# Patient Record
Sex: FEMALE | Race: BLACK OR AFRICAN AMERICAN | NOT HISPANIC OR LATINO | URBAN - METROPOLITAN AREA
[De-identification: names, ages, dates, MRNs, and addresses within clinical notes are randomized per-mention and may not be internally consistent; named-entity substitution may affect disease eponyms.]

---

## 2021-01-27 ENCOUNTER — INPATIENT (INPATIENT)
Facility: HOSPITAL | Age: 31
LOS: 1 days | Discharge: HOME | End: 2021-01-29
Attending: OBSTETRICS & GYNECOLOGY | Admitting: OBSTETRICS & GYNECOLOGY
Payer: COMMERCIAL

## 2021-01-27 VITALS
DIASTOLIC BLOOD PRESSURE: 64 MMHG | OXYGEN SATURATION: 98 % | RESPIRATION RATE: 18 BRPM | TEMPERATURE: 98 F | HEART RATE: 82 BPM | SYSTOLIC BLOOD PRESSURE: 124 MMHG

## 2021-01-27 DIAGNOSIS — Z3A.00 WEEKS OF GESTATION OF PREGNANCY NOT SPECIFIED: ICD-10-CM

## 2021-01-27 DIAGNOSIS — Z34.80 ENCOUNTER FOR SUPERVISION OF OTHER NORMAL PREGNANCY, UNSPECIFIED TRIMESTER: ICD-10-CM

## 2021-01-27 DIAGNOSIS — O26.899 OTHER SPECIFIED PREGNANCY RELATED CONDITIONS, UNSPECIFIED TRIMESTER: ICD-10-CM

## 2021-01-27 LAB
BASOPHILS # BLD AUTO: 0.01 K/UL — SIGNIFICANT CHANGE UP (ref 0–0.2)
BASOPHILS NFR BLD AUTO: 0.1 % — SIGNIFICANT CHANGE UP (ref 0–2)
BLD GP AB SCN SERPL QL: NEGATIVE — SIGNIFICANT CHANGE UP
EOSINOPHIL # BLD AUTO: 0.04 K/UL — SIGNIFICANT CHANGE UP (ref 0–0.5)
EOSINOPHIL NFR BLD AUTO: 0.5 % — SIGNIFICANT CHANGE UP (ref 0–6)
HCT VFR BLD CALC: 31.9 % — LOW (ref 34.5–45)
HGB BLD-MCNC: 10.4 G/DL — LOW (ref 11.5–15.5)
IMM GRANULOCYTES NFR BLD AUTO: 0.6 % — SIGNIFICANT CHANGE UP (ref 0–1.5)
LYMPHOCYTES # BLD AUTO: 2 K/UL — SIGNIFICANT CHANGE UP (ref 1–3.3)
LYMPHOCYTES # BLD AUTO: 24 % — SIGNIFICANT CHANGE UP (ref 13–44)
MCHC RBC-ENTMCNC: 27.1 PG — SIGNIFICANT CHANGE UP (ref 27–34)
MCHC RBC-ENTMCNC: 32.6 GM/DL — SIGNIFICANT CHANGE UP (ref 32–36)
MCV RBC AUTO: 83.1 FL — SIGNIFICANT CHANGE UP (ref 80–100)
MONOCYTES # BLD AUTO: 0.83 K/UL — SIGNIFICANT CHANGE UP (ref 0–0.9)
MONOCYTES NFR BLD AUTO: 10 % — SIGNIFICANT CHANGE UP (ref 2–14)
NEUTROPHILS # BLD AUTO: 5.4 K/UL — SIGNIFICANT CHANGE UP (ref 1.8–7.4)
NEUTROPHILS NFR BLD AUTO: 64.8 % — SIGNIFICANT CHANGE UP (ref 43–77)
NRBC # BLD: 0 /100 WBCS — SIGNIFICANT CHANGE UP (ref 0–0)
PLATELET # BLD AUTO: 184 K/UL — SIGNIFICANT CHANGE UP (ref 150–400)
RBC # BLD: 3.84 M/UL — SIGNIFICANT CHANGE UP (ref 3.8–5.2)
RBC # FLD: 16 % — HIGH (ref 10.3–14.5)
RH IG SCN BLD-IMP: POSITIVE — SIGNIFICANT CHANGE UP
SARS-COV-2 IGG SERPL QL IA: POSITIVE
SARS-COV-2 IGM SERPL IA-ACNC: 2.78 INDEX — HIGH
SARS-COV-2 RNA SPEC QL NAA+PROBE: SIGNIFICANT CHANGE UP
T PALLIDUM AB TITR SER: NEGATIVE — SIGNIFICANT CHANGE UP
WBC # BLD: 8.33 K/UL — SIGNIFICANT CHANGE UP (ref 3.8–10.5)
WBC # FLD AUTO: 8.33 K/UL — SIGNIFICANT CHANGE UP (ref 3.8–10.5)

## 2021-01-27 RX ORDER — DIPHENHYDRAMINE HCL 50 MG
25 CAPSULE ORAL EVERY 6 HOURS
Refills: 0 | Status: DISCONTINUED | OUTPATIENT
Start: 2021-01-27 | End: 2021-01-29

## 2021-01-27 RX ORDER — TETANUS TOXOID, REDUCED DIPHTHERIA TOXOID AND ACELLULAR PERTUSSIS VACCINE, ADSORBED 5; 2.5; 8; 8; 2.5 [IU]/.5ML; [IU]/.5ML; UG/.5ML; UG/.5ML; UG/.5ML
0.5 SUSPENSION INTRAMUSCULAR ONCE
Refills: 0 | Status: DISCONTINUED | OUTPATIENT
Start: 2021-01-27 | End: 2021-01-29

## 2021-01-27 RX ORDER — BENZOCAINE 10 %
1 GEL (GRAM) MUCOUS MEMBRANE EVERY 6 HOURS
Refills: 0 | Status: DISCONTINUED | OUTPATIENT
Start: 2021-01-27 | End: 2021-01-29

## 2021-01-27 RX ORDER — MAGNESIUM HYDROXIDE 400 MG/1
30 TABLET, CHEWABLE ORAL
Refills: 0 | Status: DISCONTINUED | OUTPATIENT
Start: 2021-01-27 | End: 2021-01-29

## 2021-01-27 RX ORDER — CITRIC ACID/SODIUM CITRATE 300-500 MG
15 SOLUTION, ORAL ORAL ONCE
Refills: 0 | Status: COMPLETED | OUTPATIENT
Start: 2021-01-27 | End: 2021-01-27

## 2021-01-27 RX ORDER — SODIUM CHLORIDE 9 MG/ML
1000 INJECTION, SOLUTION INTRAVENOUS
Refills: 0 | Status: DISCONTINUED | OUTPATIENT
Start: 2021-01-27 | End: 2021-01-27

## 2021-01-27 RX ORDER — MORPHINE SULFATE 50 MG/1
4 CAPSULE, EXTENDED RELEASE ORAL ONCE
Refills: 0 | Status: DISCONTINUED | OUTPATIENT
Start: 2021-01-27 | End: 2021-01-27

## 2021-01-27 RX ORDER — OXYCODONE HYDROCHLORIDE 5 MG/1
5 TABLET ORAL
Refills: 0 | Status: DISCONTINUED | OUTPATIENT
Start: 2021-01-27 | End: 2021-01-29

## 2021-01-27 RX ORDER — LANOLIN
1 OINTMENT (GRAM) TOPICAL EVERY 6 HOURS
Refills: 0 | Status: DISCONTINUED | OUTPATIENT
Start: 2021-01-27 | End: 2021-01-29

## 2021-01-27 RX ORDER — DIBUCAINE 1 %
1 OINTMENT (GRAM) RECTAL EVERY 6 HOURS
Refills: 0 | Status: DISCONTINUED | OUTPATIENT
Start: 2021-01-27 | End: 2021-01-29

## 2021-01-27 RX ORDER — CITRIC ACID/SODIUM CITRATE 300-500 MG
15 SOLUTION, ORAL ORAL EVERY 6 HOURS
Refills: 0 | Status: DISCONTINUED | OUTPATIENT
Start: 2021-01-27 | End: 2021-01-27

## 2021-01-27 RX ORDER — IBUPROFEN 200 MG
600 TABLET ORAL EVERY 6 HOURS
Refills: 0 | Status: DISCONTINUED | OUTPATIENT
Start: 2021-01-27 | End: 2021-01-29

## 2021-01-27 RX ORDER — ACETAMINOPHEN 500 MG
975 TABLET ORAL
Refills: 0 | Status: DISCONTINUED | OUTPATIENT
Start: 2021-01-27 | End: 2021-01-29

## 2021-01-27 RX ORDER — KETOROLAC TROMETHAMINE 30 MG/ML
30 SYRINGE (ML) INJECTION ONCE
Refills: 0 | Status: DISCONTINUED | OUTPATIENT
Start: 2021-01-27 | End: 2021-01-27

## 2021-01-27 RX ORDER — OXYCODONE HYDROCHLORIDE 5 MG/1
5 TABLET ORAL ONCE
Refills: 0 | Status: DISCONTINUED | OUTPATIENT
Start: 2021-01-27 | End: 2021-01-29

## 2021-01-27 RX ORDER — AER TRAVELER 0.5 G/1
1 SOLUTION RECTAL; TOPICAL EVERY 4 HOURS
Refills: 0 | Status: DISCONTINUED | OUTPATIENT
Start: 2021-01-27 | End: 2021-01-29

## 2021-01-27 RX ORDER — OXYTOCIN 10 UNIT/ML
333.33 VIAL (ML) INJECTION
Qty: 20 | Refills: 0 | Status: DISCONTINUED | OUTPATIENT
Start: 2021-01-27 | End: 2021-01-29

## 2021-01-27 RX ORDER — IBUPROFEN 200 MG
600 TABLET ORAL EVERY 6 HOURS
Refills: 0 | Status: COMPLETED | OUTPATIENT
Start: 2021-01-27 | End: 2021-12-26

## 2021-01-27 RX ORDER — SIMETHICONE 80 MG/1
80 TABLET, CHEWABLE ORAL EVERY 4 HOURS
Refills: 0 | Status: DISCONTINUED | OUTPATIENT
Start: 2021-01-27 | End: 2021-01-29

## 2021-01-27 RX ORDER — HYDROCORTISONE 1 %
1 OINTMENT (GRAM) TOPICAL EVERY 6 HOURS
Refills: 0 | Status: DISCONTINUED | OUTPATIENT
Start: 2021-01-27 | End: 2021-01-29

## 2021-01-27 RX ORDER — SODIUM CHLORIDE 9 MG/ML
3 INJECTION INTRAMUSCULAR; INTRAVENOUS; SUBCUTANEOUS EVERY 8 HOURS
Refills: 0 | Status: DISCONTINUED | OUTPATIENT
Start: 2021-01-27 | End: 2021-01-29

## 2021-01-27 RX ORDER — PRAMOXINE HYDROCHLORIDE 150 MG/15G
1 AEROSOL, FOAM RECTAL EVERY 4 HOURS
Refills: 0 | Status: DISCONTINUED | OUTPATIENT
Start: 2021-01-27 | End: 2021-01-29

## 2021-01-27 RX ADMIN — Medication 975 MILLIGRAM(S): at 20:48

## 2021-01-27 RX ADMIN — Medication 15 MILLILITER(S): at 08:23

## 2021-01-27 RX ADMIN — SODIUM CHLORIDE 125 MILLILITER(S): 9 INJECTION, SOLUTION INTRAVENOUS at 14:37

## 2021-01-27 RX ADMIN — Medication 1000 MILLIUNIT(S)/MIN: at 13:21

## 2021-01-27 RX ADMIN — MORPHINE SULFATE 4 MILLIGRAM(S): 50 CAPSULE, EXTENDED RELEASE ORAL at 10:55

## 2021-01-27 RX ADMIN — Medication 30 MILLIGRAM(S): at 14:06

## 2021-01-27 NOTE — OB PROVIDER TRIAGE NOTE - NSHPPHYSICALEXAM_GEN_ALL_CORE
Gen: awake, alert, laying bed  Chest: nonlabored breathing  Abd: soft, nontender, gravid  : NEFG  VE; 3.5/60/-3  Ext: nontender

## 2021-01-27 NOTE — OB PROVIDER TRIAGE NOTE - NSOBPROVIDERNOTE_OBGYN_ALL_OB_FT
29yo  @ 39w2d presenting with c/o ctx q3-10 min >24hrs.    0655: reviewed management options with patient including therapeutic rest with morphine vs ambulating out of hospital vs admission and walking. pt would like to discuss with friend on phone.    Mercedes Vazquez R2 31yo  @ 39w2d presenting with c/o ctx q3-10 min >24hrs.    0655: reviewed management options with patient including therapeutic rest with morphine vs ambulating out of hospital vs admission and walking. pt would like to discuss with friend on phone.  0703: pt does not want morphine or epidural. also does not feel comfortable leaving hospital for ambulation. will re-eval for cervical change in 2hr.     d/w Dr. Ramy Vazquez R2

## 2021-01-27 NOTE — OB PROVIDER DELIVERY SUMMARY - NSPROVIDERDELIVERYNOTE_OBGYN_ALL_OB_FT
vaginal delivery of viable male infant over second degree laceration, peds present light meconium,  nose and mouth suctioned on field.  placenta 3 vessel cord spontaneously delivered.  vagina and cervix examined second degree laceration and right periurethral laceration repaired,  cytotec placed in rectum uterine atony,  responded to cytotec with good tone

## 2021-01-27 NOTE — OB PROVIDER H&P - HISTORY OF PRESENT ILLNESS
29yo  @ 39w2d presenting in labor. Pt denies LOF, heavy vaginal bleeding. +FM. She does not desire an epidural.  PNC: Yimi, uncomplicated, no reported genetic/sono abnl. Does not know GBS status. EFW 3700.   GBS Neg  EFW 3600  Meds: none  All: NKDA    OBHx:  G1 2013 FT  7-8#  G2-3 TOP s/p meds  GYNHx: denies fibroids, ov cysts, STIs, abnl Pap smears  Soc: denies T/E/D, reports safe at home  Psych: denies  Will accept blood products.    Vital Signs Last 24 Hrs  Vital Signs Last 24 Hrs  T(C): 36.9 (2021 05:54), Max: 36.9 (2021 05:54)  T(F): 98.4 (2021 05:54), Max: 98.4 (2021 05:54)  HR: 108 (2021 09:40) (82 - 108)  BP: 124/64 (2021 06:05) (124/64 - 124/64)  BP(mean): --  RR: 18 (2021 05:54) (18 - 18)  SpO2: 100% (2021 09:40) (98% - 100%)    FHT: 130/mod/+accel/-decel  [place]: irregular

## 2021-01-27 NOTE — OB PROVIDER TRIAGE NOTE - NS_OBGYNHISTORY_OBGYN_ALL_OB_FT
OBHx:  G1 2013 FT  7-8#  G2-3 TOP s/p meds  G4 current    GYNHx: denies fibroids, ov cysts, STIs, abnl Pap smears

## 2021-01-27 NOTE — OB RN DELIVERY SUMMARY - NS_SEPSISRSKCALC_OBGYN_ALL_OB_FT
EOS calculated successfully. EOS Risk Factor: 0.5/1000 live births ([facility] national incidence); GA=39w2d; Temp=98.4; ROM=0.117; GBS='Negative'; Antibiotics='No antibiotics or any antibiotics < 2 hrs prior to birth'

## 2021-01-27 NOTE — OB PROVIDER H&P - ASSESSMENT
29yo  @ 39w2d presenting in labor. Pt denies LOF, heavy vaginal bleeding. +FM. She does not desire an epidural.  PNC: Yimi, uncomplicated, no reported genetic/sono abnl. Does not know GBS status. EFW 3700.   GBS Neg  EFW 3600  Meds: none  All: NKDA    OBHx:  G1  FT  7-8#  G2-3 TOP s/p meds  GYNHx: denies fibroids, ov cysts, STIs, abnl Pap smears  Soc: denies T/E/D, reports safe at home  Psych: denies  Will accept blood products.    Vital Signs Last 24 Hrs  Vital Signs Last 24 Hrs  T(C): 36.9 (2021 05:54), Max: 36.9 (2021 05:54)  T(F): 98.4 (2021 05:54), Max: 98.4 (2021 05:54)  HR: 108 (2021 09:40) (82 - 108)  BP: 124/64 (2021 06:05) (124/64 - 124/64)  BP(mean): --  RR: 18 (2021 05:54) (18 - 18)  SpO2: 100% (2021 09:40) (98% - 100%)    FHT: 130/mod/+accel/-decel  [place]: irregular      PE:  CV: RRR  Pulm: CTA bl  Abd: soft/NT gravid  VE: 5/90/-2  FHT:  135/ mod ari/ (+) accels/ (-) decels EFRA  TOCO:  irreg ctx    Plan:  Admit to L&D  EFM/[place]  Routine labs  Covid swab  IVFluids  NPO/Bicitra  Anesthesia c/s- if pt desires epidural  Anticipated   D/W Dr. Mccann

## 2021-01-27 NOTE — OB PROVIDER H&P - NSHPPHYSICALEXAM_GEN_ALL_CORE
CV: RRR  Pulm: CTA bl  Abd: soft/NT gravid  VE: 5/90/-2  FHT:  135/ mod ari/ (+) accels/ (-) decels EFRA  TOCO:  irreg ctx

## 2021-01-27 NOTE — OB PROVIDER TRIAGE NOTE - HISTORY OF PRESENT ILLNESS
29yo  @ 39w2d presenting with c/o ctx q3-10 min since 4pm yesterday. Was last checked in the office last week, reportedly closed/long/high. Pt denies LOF, heavy vaginal bleeding. +FM.    PNC: Yimi, uncomplicated, no reported genetic/sono abnl. Does not know GBS status. EFW 3700.     Meds: none  All: NKDA    Soc: denies T/E/D, reports safe at home  Psych: denies    Will accept blood products.    Vital Signs Last 24 Hrs  T(C): 36.9 (2021 05:54), Max: 36.9 (2021 05:54)  T(F): 98.4 (2021 05:54), Max: 98.4 (2021 05:54)  HR: 82 (2021 06:59) (82 - 92)  BP: 124/64 (2021 06:05) (124/64 - 124/64)  RR: 18 (2021 05:54) (18 - 18)  SpO2: 98% (2021 06:59) (98% - 100%)    FHT: 130/mod/+accel/-decel  [place]: infrequent 31yo  @ 39w2d presenting with c/o ctx q3-10 min since 4pm yesterday. Was last checked in the office last week, reportedly closed/long/high. Pt denies LOF, heavy vaginal bleeding. +FM. She does not want an epidural.    PNC: Yimi, uncomplicated, no reported genetic/sono abnl. Does not know GBS status. EFW 3700.     Meds: none  All: NKDA    Soc: denies T/E/D, reports safe at home  Psych: denies    Will accept blood products.    Vital Signs Last 24 Hrs  T(C): 36.9 (2021 05:54), Max: 36.9 (2021 05:54)  T(F): 98.4 (2021 05:54), Max: 98.4 (2021 05:54)  HR: 82 (2021 06:59) (82 - 92)  BP: 124/64 (2021 06:05) (124/64 - 124/64)  RR: 18 (2021 05:54) (18 - 18)  SpO2: 98% (2021 06:59) (98% - 100%)    FHT: 130/mod/+accel/-decel  [place]: infrequent

## 2021-01-27 NOTE — OB NEONATOLOGY/PEDIATRICIAN DELIVERY SUMMARY - NSPEDSNEONOTESA_OBGYN_ALL_OB_FT
Requested to attend [facility] CS delivery due to meconium stain. Mother is a  29yo  at  39.2weeks of gestation. Prenatal labs O+, negative/NR/immune. GBS negative from unknown. Maternal PMHx: unremarkable. Prenatal Care uncomplicated. ROM at 11:39AM  (20 minutes prior to delivery), meconium stain fluid. Delivery by , Vertex presentation. Emerged vigorous with spontaneous cry. Warmed, dried, stimulated and suctioned. APGAR 9/9.  Mother wants breast and bottle feeding, desires HepB vaccine.

## 2021-01-27 NOTE — OB RN TRIAGE NOTE - PMH
(normal spontaneous vaginal delivery)      (normal spontaneous vaginal delivery)    Termination of pregnancy (fetus)  x2

## 2021-01-28 RX ADMIN — Medication 975 MILLIGRAM(S): at 02:40

## 2021-01-28 RX ADMIN — Medication 600 MILLIGRAM(S): at 05:43

## 2021-01-28 RX ADMIN — Medication 1 TABLET(S): at 12:51

## 2021-01-28 RX ADMIN — Medication 600 MILLIGRAM(S): at 18:48

## 2021-01-28 RX ADMIN — Medication 600 MILLIGRAM(S): at 00:20

## 2021-01-28 RX ADMIN — Medication 975 MILLIGRAM(S): at 20:46

## 2021-01-28 RX ADMIN — Medication 600 MILLIGRAM(S): at 12:51

## 2021-01-29 VITALS
DIASTOLIC BLOOD PRESSURE: 71 MMHG | SYSTOLIC BLOOD PRESSURE: 111 MMHG | HEART RATE: 86 BPM | RESPIRATION RATE: 18 BRPM | TEMPERATURE: 98 F | OXYGEN SATURATION: 96 %

## 2021-01-29 PROCEDURE — 87635 SARS-COV-2 COVID-19 AMP PRB: CPT

## 2021-01-29 PROCEDURE — 86780 TREPONEMA PALLIDUM: CPT

## 2021-01-29 PROCEDURE — 86769 SARS-COV-2 COVID-19 ANTIBODY: CPT

## 2021-01-29 PROCEDURE — U0005: CPT

## 2021-01-29 PROCEDURE — G0463: CPT

## 2021-01-29 PROCEDURE — 86850 RBC ANTIBODY SCREEN: CPT

## 2021-01-29 PROCEDURE — 86900 BLOOD TYPING SEROLOGIC ABO: CPT

## 2021-01-29 PROCEDURE — 86901 BLOOD TYPING SEROLOGIC RH(D): CPT

## 2021-01-29 PROCEDURE — 59050 FETAL MONITOR W/REPORT: CPT

## 2021-01-29 PROCEDURE — 59025 FETAL NON-STRESS TEST: CPT

## 2021-01-29 PROCEDURE — 85025 COMPLETE CBC W/AUTO DIFF WBC: CPT

## 2021-01-29 RX ORDER — ACETAMINOPHEN 500 MG
3 TABLET ORAL
Qty: 0 | Refills: 0 | DISCHARGE
Start: 2021-01-29

## 2021-01-29 RX ORDER — IBUPROFEN 200 MG
1 TABLET ORAL
Qty: 0 | Refills: 0 | DISCHARGE
Start: 2021-01-29

## 2021-01-29 RX ADMIN — Medication 975 MILLIGRAM(S): at 03:45

## 2021-01-29 RX ADMIN — Medication 600 MILLIGRAM(S): at 05:56

## 2021-01-29 RX ADMIN — Medication 975 MILLIGRAM(S): at 09:00

## 2021-01-29 RX ADMIN — Medication 600 MILLIGRAM(S): at 00:39

## 2021-01-29 RX ADMIN — Medication 1 TABLET(S): at 13:28

## 2021-01-29 RX ADMIN — Medication 600 MILLIGRAM(S): at 13:28

## 2021-01-29 NOTE — DISCHARGE NOTE OB - MATERIALS PROVIDED
[facility] [place] Screening Program/[place]  Immunization Record/Breastfeeding Log/Breastfeeding Mother’s Support Group Information/Guide to Postpartum Care/[facility] Hearing Screen Program/Back To Sleep Handout/Shaken Baby Prevention Handout/Breastfeeding Guide and Packet/Birth Certificate Instructions

## 2021-01-29 NOTE — PROGRESS NOTE ADULT - SUBJECTIVE AND OBJECTIVE BOX
Postpartum Note- PPD#1    Allergies: No Known Allergies    Rubella immune  RPR Negative  Blood Type  O  --  Positive      Patient w/o complaints, pain is controlled.  Pt is OOB, tolerating PO, passing flatus. Lochia WNL. Pt requests "a " consult, but did not feel comfortable divulging reason at this time.     O:  Vital Signs Last 24 Hrs  T(C): 37 (28 Jan 2021 05:21), Max: 37 (28 Jan 2021 05:21)  T(F): 98.6 (28 Jan 2021 05:21), Max: 98.6 (28 Jan 2021 05:21)  HR: 75 (28 Jan 2021 05:21) (75 - 109)  BP: 127/80 (28 Jan 2021 05:21) (110/66 - 138/97)  BP(mean): --  RR: 18 (28 Jan 2021 05:21) (18 - 20)  SpO2: 98% (28 Jan 2021 05:21) (98% - 100%)     Gen: NAD  Heart: S1S2 RRR  Lungs: CTA b/l  Abdomen: Soft, nontender, non-distended, fundus firm.  Lochia WNL  Ext: Neg edema, Neg calf tenderness    LABS:               10.4   8.33  )-----------( 184      ( 01-27 @ 11:01 )             31.9         PMHx: none  Current Issues:               
post partum day one normal delivery has afterbirth cramps, pain well controlled with motrin,  lochia moderate no calf pain no shortness of breath no headaches or blurry vision,  0+ blood type, has covid antibodies afebrile fundus firm.
post partum day 2 patient without complaints, pain well controlled, nursing, lochia within normal limits no calf pain, has been ambulating and tolerating oral diet, afebrile  O+ blood type  hemoglobin 10

## 2021-01-29 NOTE — DISCHARGE NOTE OB - PATIENT PORTAL LINK FT
You can access the FollowMyHealth Patient Portal offered by [facility] by registering at the following website: [email or web address]. By joining [email or web address]’s FollowMyHealth portal, you will also be able to view your health information using other applications (apps) compatible with our system.

## 2021-01-29 NOTE — DISCHARGE NOTE OB - CARE PLAN
Principal Discharge DX:	 (normal spontaneous vaginal delivery)  Goal:	recovery  Assessment and plan of treatment:	male infant recovery from delivery

## 2021-01-29 NOTE — PROGRESS NOTE ADULT - ATTENDING COMMENTS
I have personally examined evaluated and counseled patient
I have personally evaluated examined and counseled patient

## 2021-01-29 NOTE — PROGRESS NOTE ADULT - ASSESSMENT
post partum day 2 stable for discharge home, instructions precautions and warning signs reviewed, 
A/P:  30y  PPD # 1      S/P                      doing well    
post partum day one stable, currently breast feeding,  covid antibodies previous exposure.   for discharge home tomorrow if stable reviewed instructions warning signs and precautions

## 2021-01-29 NOTE — DISCHARGE NOTE OB - CARE PROVIDER_API CALL
Nataliya Mccann  OBSTETRICS AND GYNECOLOGY  [address]  [place] [address]  Phone: [phone]  Fax: [phone]  Follow Up Time:

## 2021-01-29 NOTE — DISCHARGE NOTE OB - HOSPITAL COURSE
The patient was admitted in labor she had a normal vaginal delivery of viable male infant,  she had an uncomplicated post partum course rh +  hemoglobin 10

## 2021-01-29 NOTE — PROGRESS NOTE ADULT - PROBLEM SELECTOR PLAN 1
Increase OOB  Regular diet  PO Pain protocol   consult placed  Continue routine prenatal care  Discharge Planning
for discharge home today
recovery
